# Patient Record
Sex: FEMALE | Race: WHITE | Employment: OTHER | URBAN - METROPOLITAN AREA
[De-identification: names, ages, dates, MRNs, and addresses within clinical notes are randomized per-mention and may not be internally consistent; named-entity substitution may affect disease eponyms.]

---

## 2019-12-14 ENCOUNTER — APPOINTMENT (OUTPATIENT)
Dept: GENERAL RADIOLOGY | Facility: CLINIC | Age: 74
End: 2019-12-14
Attending: PHYSICIAN ASSISTANT
Payer: COMMERCIAL

## 2019-12-14 ENCOUNTER — HOSPITAL ENCOUNTER (EMERGENCY)
Facility: CLINIC | Age: 74
Discharge: HOME | End: 2019-12-14
Attending: PHYSICIAN ASSISTANT | Admitting: PHYSICIAN ASSISTANT
Payer: COMMERCIAL

## 2019-12-14 VITALS
RESPIRATION RATE: 16 BRPM | BODY MASS INDEX: 19.99 KG/M2 | DIASTOLIC BLOOD PRESSURE: 62 MMHG | WEIGHT: 120 LBS | HEIGHT: 65 IN | SYSTOLIC BLOOD PRESSURE: 144 MMHG | TEMPERATURE: 98.2 F | OXYGEN SATURATION: 99 %

## 2019-12-14 DIAGNOSIS — S62.101A CLOSED FRACTURE OF RIGHT WRIST, INITIAL ENCOUNTER: ICD-10-CM

## 2019-12-14 PROCEDURE — 73110 X-RAY EXAM OF WRIST: CPT | Mod: RT

## 2019-12-14 PROCEDURE — 25600 CLTX DST RDL FX/EPHYS SEP WO: CPT | Mod: RT

## 2019-12-14 PROCEDURE — 99284 EMERGENCY DEPT VISIT MOD MDM: CPT | Mod: 25

## 2019-12-14 ASSESSMENT — ENCOUNTER SYMPTOMS
ARTHRALGIAS: 1
NUMBNESS: 0
JOINT SWELLING: 1
MYALGIAS: 0

## 2019-12-14 ASSESSMENT — MIFFLIN-ST. JEOR: SCORE: 1027.26

## 2019-12-14 NOTE — ED PROVIDER NOTES
"  History     Chief Complaint:  Wrist Pain      HPI   Jenny Davison is a right handed 74 year old female with a history of osteoarthritis who presents to the ED for evaluation of right wrist pain. The patient reports that she was watching her grandson's hockey game around 1530 this afternoon, when a hockey puck suddenly came flying towards her. She tried to get out of the way and fell backwards, bracing herself with her outstretched right arm and landing directly on her buttocks. She did not hit her head or lose consciousness. The patient states that she began to develop right medial wrist pain and swelling, and so she applied ice to the area and presented to the ED at her PCP's recommendation. She did not take any pain medication prior to arrival. Here she endorses continued pain and swelling but denies any numbness or tingling. She denies pain to the fingers, right hand, or elbow. She also denies any buttock pain and has been ambulatory since the fall.     Allergies:  NKDA     Medications:    The patient is currently on no regular medications.     Past Medical History:    EBCT abnormal  Osteoarthritis  Hyperlipidemia  Vitamin D deficiency    Past Surgical History:    Cataract IOL  Knee surgery  Laser Yag capsulotomy    Family History:    No past pertinent family history.    Social History:  Smoking Status: Former smoker  Alcohol Use: Yes (14 glasses of wine/week)  Negative for drug use.   Marital Status:   [2]     Review of Systems   Musculoskeletal: Positive for arthralgias (R wrist) and joint swelling. Negative for gait problem and myalgias.   Neurological: Negative for numbness.   All other systems reviewed and are negative.    Physical Exam     Patient Vitals for the past 24 hrs:   BP Temp Temp src Heart Rate Resp SpO2 Height Weight   12/14/19 1717 (!) 144/62 98.2  F (36.8  C) Oral 66 16 99 % 1.638 m (5' 4.5\") 54.4 kg (120 lb)        Physical Exam  General: Resting comfortably.  Alert and oriented. "   Head:  The scalp, face, and head appear normal   Eyes:  Conjunctivae and sclerae are normal    CV:  Radial pulse intact to the right wrist.  Capillary refill is brisk in all digits of the right hand.   Resp:  No tachypnea.  No respiratory distress.  MS:  Tenderness to the radial aspect of the right wrist.  There is also tenderness over the scaphoid.  There is no hand or finger tenderness.  There is no elbow tenderness.  The patient has preserved right wrist flexion and extension.  The patient can flex and extend at the right elbow and supinate and pronate the right arm without difficulty.  The patient can hold fingers and resisted abduction, make the okay sign and hold it against resistance, and has good strength with wrist extension to the right upper extremity.  Skin:  Mild swelling noted to the right wrist.  No obvious deformity.  No lacerations or abrasions.  Neuro:  Sensation intact throughout right upper extremity.     Emergency Department Course   Imaging:  Radiographic findings were communicated with the patient who voiced understanding of the findings.  XR Wrist 3 views, Right:   Subtle lucency in the distal right radius articular  surface demonstrated on the oblique view. This is indeterminant for  nondisplaced fracture. Follow-up radiographs in 7-10 days or CT  recommended in further evaluation. No additional areas suspicious for  acute fracture. Corticated ossicle distal to the ulna styloid. No  dislocation. Mild distal radioulnar, triscaphe, and thumb CMC  degenerative arthrosis, as per radiology.     Procedures   Sugar tong Splint Placement     Splint was applied and after placement I checked and adjusted the fit to ensure proper positioning. Patient was more comfortable with splint in place. Sensation and circulation are intact after splint placement.     Emergency Department Course:  Nursing notes and vitals reviewed. (1608) I performed an exam of the patient as documented above.     The patient  was sent for a right wrist x-ray while in the emergency department, findings above.     (1811) I rechecked the patient and discussed the results of her workup thus far.     (1818) I consulted with Dr. Hernandez here in the emergency department regarding the patient's history and presentation, and he came to evaluate the patient.    Sugartong splint was placed as above.    Findings and plan explained to the Patient. Patient discharged home with instructions regarding supportive care, medications, and reasons to return. The importance of close follow-up was reviewed.     I personally reviewed the imaging results with the Patient and answered all related questions prior to discharge.      Impression & Plan    Medical Decision Making:  Jenny Davison is a 74 year old female who presents for evaluation of right wrist injury. CMS is intact distally in the extremity. She did not sustain any other injury or hit her head. Xrays reveal a fracture and there is no need for reduction at this time. Given the fracture, Dr. Hernandez was asked to staff this patient with me. Please refer to his note for further details and fracture care. The patient has no further complaints or symptoms to suggest need for further workup at this time. The patient was placed in a sugar tong Orthoglass splint. CMS intact after splint placement. There is no indication for ortho consultation from the ED. Rather, close follow-up is indicated in the next few days. Splint and fracture precautions for home. She will follow up with orthopedics in 2-3 days for recheck. She asked to return immediately for fevers, vomiting, increased pain, color change to their extremity, weakness, numbness or any other concerns. All questions were answered prior to discharge. The patient understands and agrees to this plan.     Diagnosis:    ICD-10-CM    1. Closed fracture of right wrist, initial encounter S62.101A        Disposition:  discharged to home      Scribe  Disclosure:  I, Flory Villalta, am serving as a scribe on 12/14/2019 at 5:39 PM to personally document services performed by Monet Zepeda PA* based on my observations and the provider's statements to me.     Flory Villalta  12/14/2019    EMERGENCY DEPARTMENT       Monet Zepeda PA-C  12/14/19 2045

## 2019-12-14 NOTE — ED AVS SNAPSHOT
Emergency Department  [address] [place] [id number]  Phone:  [phone]  Fax:  [phone]                                    Jenny Davison   MRN: [id number]    Department:   Emergency Department   Date of Visit:  12/14/2019           After Visit Summary Signature Page    I have received my discharge instructions, and my questions have been answered. I have discussed any challenges I see with this plan with the nurse or doctor.    ..........................................................................................................................................  Patient/Patient Representative Signature      ..........................................................................................................................................  Patient Representative Print Name and Relationship to Patient    ..................................................               ................................................  Date                                   Time    ..........................................................................................................................................  Reviewed by Signature/Title    ...................................................              ..............................................  Date                                               Time          22EPIC Rev 08/18

## 2019-12-15 NOTE — ED PROVIDER NOTES
Emergency Department Attending Supervision Note  12/14/2019  6:17 PM    I evaluated this patient in conjunction with DEN Morris    Briefly, the patient presented with right wrist pain after a fall.  No other injury associated with this.  She is been able to ambulate without concern.  Pain is over the distal right wrist.  No laceration or abrasion to the wrist.    On my exam:  Physical Exam   General:  Sitting on bed by self, comfortable appearing.   HENT:  No obvious trauma to head  Right Ear:  External ear normal.   Left Ear:  External ear normal.   Nose:  Nose normal.   Eyes:  Conjunctivae and EOM are normal.  Neck: Normal range of motion. Neck supple. No tracheal deviation present.   Pulm/Chest: No respiratory distress  M/S: Normal range of motion. Mild diffuse tenderness to the right distal wrist over the distal radius.  Compartment soft.  Radial pulse +2.  No pain to palpation of the entire right hand, right proximal forearm, elbow, humerus, proximal humerus, or shoulder.  No laceration or abrasion.  Neuro: Alert. GCS 15.  Skin: Skin is warm and dry. No rash noted. Not diaphoretic.   Psych: Normal mood and affect. Behavior is normal.     Procedure:    Narrative:    A short arm sugar tong splint was applied and after placement it was checked to ensure proper positioning. Patient was more comfortable with splint in place. Sensation and circulation are intact after splint placement.    Brief MDM:  Jenny Davison is a very pleasant 74 year old year old female who presents to the emergency department with concern of right wrist pain after a fall.  X-ray shows a likely nondisplaced fracture.  The patient be placed in a splint and referred to hand surgery for follow-up.  This may simply be a strain, but the radiologist felt there is a nondisplaced fracture on film which is consistent with the patient's exam as well.  No indication for reduction now.  No laceration to suggest open fracture.  Rice discussion  had.    The treatment plan was discussed with the patient and they expressed understanding of this plan and consented to the plan.  In addition, the patient will return to the emergency department if their symptoms persist, worsen, if new symptoms arise or if there is any concern as other pathology may be present that is not evident at this time. They also understand the importance of close follow up in the clinic and if unable to do so will return to the emergency department for a reevaluation. All questions were answered.    My Impression and diagnosis:    ICD-10-CM    1. Closed fracture of right wrist, initial encounter S62.101A          DO David Guerrier Robert James, DO  12/14/19 1858